# Patient Record
Sex: FEMALE | Race: WHITE | Employment: PART TIME | URBAN - METROPOLITAN AREA
[De-identification: names, ages, dates, MRNs, and addresses within clinical notes are randomized per-mention and may not be internally consistent; named-entity substitution may affect disease eponyms.]

---

## 2021-06-28 ENCOUNTER — OFFICE VISIT (OUTPATIENT)
Dept: ORTHOPEDIC SURGERY | Age: 31
End: 2021-06-28
Payer: COMMERCIAL

## 2021-06-28 DIAGNOSIS — M25.572 ACUTE LEFT ANKLE PAIN: Primary | ICD-10-CM

## 2021-06-28 DIAGNOSIS — S93.492A SPRAIN OF ANTERIOR TALOFIBULAR LIGAMENT OF LEFT ANKLE, INITIAL ENCOUNTER: ICD-10-CM

## 2021-06-28 PROCEDURE — L4361 PNEUMA/VAC WALK BOOT PRE OTS: HCPCS | Performed by: PHYSICIAN ASSISTANT

## 2021-06-28 PROCEDURE — 99203 OFFICE O/P NEW LOW 30 MIN: CPT | Performed by: PHYSICIAN ASSISTANT

## 2021-06-28 NOTE — LETTER
[address] [facility]  [address] [facility] [address]  Phone: [phone]  Fax: [place], [place]        June 28, 2021     Patient: Rocael Fitch   YOB: 1990   Date of Visit: 6/28/2021       To Whom it May Concern:    Rocael Fitch was seen in my clinic on 6/28/2021. She may return to work on 6/29/21. Light duty but must wear boot. .    If you have any questions or concerns, please don't hesitate to call.     Sincerely,         ALEJANDRA Mckeon

## 2021-06-28 NOTE — PROGRESS NOTES
Subjective:      Patient ID: Sharon Solis is a 27 y.o. female. Chief Complaint   Patient presents with    Pain     Left ankle - was jogging and stepped in a hole. HPI:   She is here in the [address] [place] [address]   for an initial evaluation of a new problem. Left ankle pain and swelling after tripping in a hole while jogging 2 days ago. Pain Scale 6/10 VAS. Location of pain lateral ankle. Pain is worse with weight bearing. Pain improves with [id number]. Previous treatments have included ice. Review Of Systems:   A 14 point review of systems and history form completed by the patient has been reviewed. This scanned in the media tab of the patient's chart under today's date. As outlined in the HPI. Negative for fever or chills. Negative for poly-joint pain, swelling and stiffness. Negative for numbness or tingling. History reviewed. No pertinent past medical history. History reviewed. No pertinent family history. Past Surgical History:   Procedure Laterality Date    CHOLECYSTECTOMY  01/26/12    laparoscopic cholecystectomy with intraoperative  cholangiogram    TYMPANOSTOMY TUBE PLACEMENT      WISDOM TOOTH EXTRACTION         Social History     Occupational History    Not on file   Tobacco Use    Smoking status: Never Smoker   Substance and Sexual Activity    Alcohol use: No    Drug use: No    Sexual activity: Never       Current Outpatient Medications   Medication Sig Dispense Refill    aspirin-acetaminophen-caffeine (EXCEDRIN MIGRAINE) 250-250-65 MG per tablet Take 1 tablet by mouth every 6 hours as needed. No current facility-administered medications for this visit. Objective:     She is alert, oriented x 3, pleasant, well nourished, developed and in no   acute distress. There were no vitals taken for this visit. Examination of the left ankle demonstrates:   There is moderate swelling and ecchymosis of the ankle.  There is no joint effusion. There is mild tenderness of the lateral malleolus. There is no tenderness of the medial malleolus. There is no tenderness of the fifth metatarsal.  There is moderate tenderness over the ATFL. There is no tenderness over the proximal fibula. There is no tenderness of the calcaneous. The achilles is intact. Alfaro test negative. There is no laxity with anterior drawer testing. There is no laxity with Inversion testing. There is no laxity with Eversion testing. Examination of the lower extremities are intact with sensation to light touch. Motor testing  5/5 in all major motor groups of the lower extremities. Negative Mendez's Sign. SLR negative. Examination of the lower extremities shows intact perfusion to all extremities. No cyanosis. Digits are warm to touch, capillary refill is less than 2 seconds. There is mild edema noted. Examination of the skin over both lower extremities reveals: The skin to be intact without lacerations or abrasions. No significant erythema. No significant rashes or skin lesions. X Rays: performed in the office today:   AP, Lateral and Oblique Left Ankle:  Radiographs demonstrate normal alignment of the ankle joint. There are no fractures or dislocations noted. Additional Tests reviewed: none  Additional Outside Records reviewed: none    Diagnosis:       ICD-10-CM    1. Acute left ankle pain  M25.572 XR ANKLE LEFT (MIN 3 VIEWS)   2. Sprain of anterior talofibular ligament of left ankle, initial encounter  S93.492A         Assessment and Plan:       Assessment:  Acute left ankle sprain from an inversion injury. Plan:  Medications- OTC NSAIDS discussed. She  was advised that NSAID-type medications have two very important potential side effects: gastrointestinal irritation including hemorrhage and renal injuries. She was asked to take the medication with food and to stop if she experiences any GI upset.

## 2021-07-01 ENCOUNTER — TELEPHONE (OUTPATIENT)
Dept: ORTHOPEDIC SURGERY | Age: 31
End: 2021-07-01

## 2025-04-25 ENCOUNTER — HOSPITAL ENCOUNTER (EMERGENCY)
Age: 35
Discharge: HOME | End: 2025-04-26
Payer: COMMERCIAL

## 2025-04-25 DIAGNOSIS — J02.9 ACUTE SORE THROAT: ICD-10-CM

## 2025-04-25 DIAGNOSIS — H72.92 TYMPANIC MEMBRANE RUPTURE, LEFT: ICD-10-CM

## 2025-04-25 DIAGNOSIS — H66.92 LEFT OTITIS MEDIA, UNSPECIFIED OTITIS MEDIA TYPE: Primary | ICD-10-CM

## 2025-04-25 LAB
FLUAV RNA RESP QL NAA+PROBE: NOT DETECTED
FLUBV RNA RESP QL NAA+PROBE: NOT DETECTED
S PYO AG THROAT QL: NEGATIVE
SARS-COV-2 RNA RESP QL NAA+PROBE: NOT DETECTED

## 2025-04-25 PROCEDURE — 87880 STREP A ASSAY W/OPTIC: CPT

## 2025-04-25 PROCEDURE — 87636 SARSCOV2 & INF A&B AMP PRB: CPT

## 2025-04-25 PROCEDURE — 6370000000 HC RX 637 (ALT 250 FOR IP): Performed by: PHYSICIAN ASSISTANT

## 2025-04-25 PROCEDURE — 99283 EMERGENCY DEPT VISIT LOW MDM: CPT

## 2025-04-25 RX ORDER — ACETAMINOPHEN 325 MG/1
650 TABLET ORAL ONCE
Status: COMPLETED | OUTPATIENT
Start: 2025-04-25 | End: 2025-04-25

## 2025-04-25 RX ADMIN — ACETAMINOPHEN 650 MG: 325 TABLET ORAL at 23:00

## 2025-04-25 RX ADMIN — AMOXICILLIN AND CLAVULANATE POTASSIUM 1 TABLET: 875; 125 TABLET, FILM COATED ORAL at 23:45

## 2025-04-25 ASSESSMENT — LIFESTYLE VARIABLES
HOW OFTEN DO YOU HAVE A DRINK CONTAINING ALCOHOL: NEVER
HOW MANY STANDARD DRINKS CONTAINING ALCOHOL DO YOU HAVE ON A TYPICAL DAY: PATIENT DOES NOT DRINK

## 2025-04-25 ASSESSMENT — PAIN DESCRIPTION - DESCRIPTORS: DESCRIPTORS: DISCOMFORT

## 2025-04-25 ASSESSMENT — PAIN - FUNCTIONAL ASSESSMENT: PAIN_FUNCTIONAL_ASSESSMENT: 0-10

## 2025-04-25 ASSESSMENT — PAIN DESCRIPTION - ORIENTATION: ORIENTATION: LEFT

## 2025-04-25 ASSESSMENT — PAIN DESCRIPTION - LOCATION: LOCATION: EAR

## 2025-04-26 VITALS
HEIGHT: 67 IN | DIASTOLIC BLOOD PRESSURE: 86 MMHG | HEART RATE: 98 BPM | WEIGHT: 230 LBS | OXYGEN SATURATION: 99 % | RESPIRATION RATE: 19 BRPM | SYSTOLIC BLOOD PRESSURE: 134 MMHG | BODY MASS INDEX: 36.1 KG/M2 | TEMPERATURE: 98 F

## 2025-04-26 ASSESSMENT — PAIN - FUNCTIONAL ASSESSMENT: PAIN_FUNCTIONAL_ASSESSMENT: NONE - DENIES PAIN

## 2025-04-26 ASSESSMENT — VISUAL ACUITY: OU: 1

## 2025-04-26 NOTE — ED PROVIDER NOTES
[facility] EMERGENCY DEPARTMENT  EMERGENCY DEPARTMENT ENCOUNTER        Pt Name: Anabel Nash  MRN: [id number]  Birthdate 1990  Date of evaluation: 4/25/2025  Provider: Christel Lozada PA-C  PCP: No primary care provider on file.  Note Started: 10:58 PM EDT 4/25/25      SHOBHA. I have evaluated this patient.        CHIEF COMPLAINT       Chief Complaint   Patient presents with    Ear Pain     Pt arrives from home with left ear pain x 3 hours        HISTORY OF PRESENT ILLNESS: 1 or more Elements     History From: Patient    Limitations to history : None    Social Determinants Significantly Affecting Health : None    Chief Complaint: Left ear pain    Anabel Nash is a 34 y.o. female with a PMHx of remote tympanostomy tube placement, who presents to the ED with left ear pain that began about 3 hours ago after she blew her nose and felt a pop.  Associated with her ear pain, is some vertigo although she denies change in her hearing or tinnitus, and she has not noticed any drainage from the ear.  She also endorses nasal congestion, and sore throat for the past few days.  Denies recent sick contacts.  She did have tympanostomy tubes placed bilaterally multiple times.  No recent travel.  She has been taking over-the-counter medications.  No recent head trauma.  Denies fevers, chills, nausea, vomiting.    Nursing Notes were all reviewed and agreed with or any disagreements were addressed in the HPI.    REVIEW OF SYSTEMS :      Review of Systems    Positives and Pertinent negatives as per HPI.     SURGICAL HISTORY     Past Surgical History:   Procedure Laterality Date    CHOLECYSTECTOMY  01/26/12    laparoscopic cholecystectomy with intraoperative  cholangiogram    TYMPANOSTOMY TUBE PLACEMENT      WISDOM TOOTH EXTRACTION         CURRENTMEDICATIONS       Discharge Medication List as of 4/26/2025 12:00 AM        CONTINUE these medications which have NOT CHANGED    Details   aspirin-acetaminophen-caffeine  CONDITION Stable           PATIENT REFERRED TO:  [facility]  [address]  [address]  [facility] [address]  [phone]  Schedule an appointment as soon as possible for a visit in 3 days  Follow up - Please establish care and follow up with a PCP. This is one clinic you can establish care with.    [facility] Emergency Department  [address]  [facility] [address] [phone]  Go to   As needed, If symptoms worsen      DISCHARGE MEDICATIONS:  Discharge Medication List as of 4/26/2025 12:00 AM        START taking these medications    Details   amoxicillin-clavulanate (AUGMENTIN) 875-125 MG per tablet Take 1 tablet by mouth 2 times daily for 7 days, Disp-14 tablet, R-0Normal             DISCONTINUED MEDICATIONS:  Discharge Medication List as of 4/26/2025 12:00 AM                 (Please note that portions of this note were completed with a voice recognition program.  Efforts were made to edit the dictations but occasionally words are mis-transcribed.)    Christel Lozada PA-C (electronically signed)      Christel Lozada PA-C  04/26/25 [id number]

## 2025-04-26 NOTE — DISCHARGE INSTRUCTIONS
You do have an ear infection, which has lead to perforation of your eardrum. I will send a prescription for antibiotics that I would like you to take. Please take the whole course, even if you feel better before finishing them. Please avoid swimming or getting any water in your ear. Please come back to the ED if you have any new or worsening symptoms.  Please follow-up with your PCP next week.  At home you can take Tylenol or NSAIDs for pain as needed.